# Patient Record
(demographics unavailable — no encounter records)

---

## 2025-02-06 NOTE — HISTORY OF PRESENT ILLNESS
[de-identified] : Abdominal pain  referred by  JOS DILLON , is  a 10 year old female here for initial consultation for abdominal pain, vomiting and diarrhea for the past 2 months.   Patient reports abdominal pain that started a couple of weeks before [place], initially sporadic but now occurring daily. Pain is described as twisting and turning, located around the belly button, lasting 30 minutes to an hour, and occurring throughout the day. Pain severity is rated as less than 10/10. Symptoms worsen with eating but also occur without food intake. Patient experiences diarrhea approximately twice a week, coinciding with pain episodes. Vomiting occurs less frequently, with the last episode being mostly liquid and foamy white.  Stools are described as ([place] Stool Chart type 1-2) every other day, requiring straining. Recent treatment with Pepsid worsened symptoms. No weight loss reported. This past week she had abdominal pain and vomiting.  Abdominal pain was worse than previously.  She stayed home from school 2 days.  Did not have a bowel movement on Tuesday so dad gave her Dulcolax suppository.  She then had some loose stools.   Did see the PCP 2 weeks ago for the symptoms.  Episode was recommended and tried.  Pepcid exacerbated her symptoms.  Labs were done.  CBC, CMP, celiac screen, CRP were all normal.   no weight loss varied diet.   does eat cheese  Stools are described as every other day.  usually look like type I or II.  does have diarrhea intermittently as well.  endorsees straining.  no blood or mucus noted.   Denies  easy bleeding or bruising, jaundice, hematochezia, melena, recurrent fevers or infection, mouth sores, joint swelling, vision changes, unintentional weight loss.   Denies choking, dysphagia, cyanosis with feeds.

## 2025-02-06 NOTE — ASSESSMENT
[Educated Patient & Family about Diagnosis] : educated the patient and family about the diagnosis [FreeTextEntry1] : - Order stool test for infection and Calprotectin to assess for inflammation  - Recommend Calm gummies for kids (1-2 per day) to soften stools  - Increase dietary fiber intake  - Trial lactose-free diet for 5-7 days or switch to lower lactose cheeses  - Prescribe  IBGuard (peppermint oil) capsules or Iberogast for GI discomfort  - Prescribe hyoscyamine as needed for severe pain (antispasmodic)  - Follow-up appointment in 6 weeks  - Pharmacy: [facility] in [place]

## 2025-02-06 NOTE — CONSULT LETTER
[Dear  ___] : Dear  [unfilled], [Consult Letter:] : I had the pleasure of evaluating your patient, [unfilled]. [Please see my note below.] : Please see my note below. [Consult Closing:] : Thank you very much for allowing me to participate in the care of this patient.  If you have any questions, please do not hesitate to contact me. [Sincerely,] : Sincerely, [FreeTextEntry3] : Nguyen Baker MD [facility] physician partners Pediatric gastroenterologist , [facility] of medicine at [facility] [phone] [email or web address]

## 2025-02-06 NOTE — CONSULT LETTER
[Dear  ___] : Dear  [unfilled], [Consult Letter:] : I had the pleasure of evaluating your patient, [unfilled]. [Please see my note below.] : Please see my note below. [Consult Closing:] : Thank you very much for allowing me to participate in the care of this patient.  If you have any questions, please do not hesitate to contact me. [Sincerely,] : Sincerely, [FreeTextEntry3] : Nguyen Baker MD [facility] physician partners Pediatric gastroenterologist , [facility] of medicine at [facility] [phone] [email or web address]

## 2025-02-06 NOTE — HISTORY OF PRESENT ILLNESS
[de-identified] : Abdominal pain  referred by  JOS DILLON , is  a 10 year old female here for initial consultation for abdominal pain, vomiting and diarrhea for the past 2 months.   Patient reports abdominal pain that started a couple of weeks before [place], initially sporadic but now occurring daily. Pain is described as twisting and turning, located around the belly button, lasting 30 minutes to an hour, and occurring throughout the day. Pain severity is rated as less than 10/10. Symptoms worsen with eating but also occur without food intake. Patient experiences diarrhea approximately twice a week, coinciding with pain episodes. Vomiting occurs less frequently, with the last episode being mostly liquid and foamy white.  Stools are described as ([place] Stool Chart type 1-2) every other day, requiring straining. Recent treatment with Pepsid worsened symptoms. No weight loss reported. This past week she had abdominal pain and vomiting.  Abdominal pain was worse than previously.  She stayed home from school 2 days.  Did not have a bowel movement on Tuesday so dad gave her Dulcolax suppository.  She then had some loose stools.   Did see the PCP 2 weeks ago for the symptoms.  Episode was recommended and tried.  Pepcid exacerbated her symptoms.  Labs were done.  CBC, CMP, celiac screen, CRP were all normal.   no weight loss varied diet.   does eat cheese  Stools are described as every other day.  usually look like type I or II.  does have diarrhea intermittently as well.  endorsees straining.  no blood or mucus noted.   Denies  easy bleeding or bruising, jaundice, hematochezia, melena, recurrent fevers or infection, mouth sores, joint swelling, vision changes, unintentional weight loss.   Denies choking, dysphagia, cyanosis with feeds.

## 2025-02-06 NOTE — PAST MEDICAL HISTORY
[At ___ Weeks Gestation] : at [unfilled] weeks gestation [ Section] : by  section [] : There were no problems passing meconium within 24 - 48 hrs of life

## 2025-03-20 NOTE — CONSULT LETTER
[Dear  ___] : Dear  [unfilled], [Consult Letter:] : I had the pleasure of evaluating your patient, [unfilled]. [Please see my note below.] : Please see my note below. [Consult Closing:] : Thank you very much for allowing me to participate in the care of this patient.  If you have any questions, please do not hesitate to contact me. [Sincerely,] : Sincerely, [FreeTextEntry3] : Nguyen Baker MD [facility] physician partners Pediatric gastroenterologist , [facility] of medicine at [facility] [phone] [email or web address]

## 2025-03-20 NOTE — ASSESSMENT
[Educated Patient & Family about Diagnosis] : educated the patient and family about the diagnosis [FreeTextEntry1] : 10-year-old female with intermittent periumbilical abdominal pain, obstipation and nausea.  Labs including calprotectin normal.  GI PCR notable for astrovirus.  Some improvement with IBgard and fiber supplementation.  Continues to have hard large stools every other day.  Differential diagnosis includes constipation, versus IBS   Would recommend 2 caps of MiraLAX daily for 5 days and then 1 cap of MiraLAX daily.  If stools are not almost daily and not soft consistency recommend 1 Dulcolax tablet daily  Continue IBgard and fiber  Reviewed proper toileting techniques and advised not to sit more than 10 minutes  Reviewed daily exercise needs  Use Zofran every 8 hours as needed for nausea or ginger candies  Follow-up in 8 weeks or as needed

## 2025-03-20 NOTE — HISTORY OF PRESENT ILLNESS
[de-identified] : Abdominal pain  referred by  JOS DILLON , is  a 10 year old female here for  FU  consultation for abdominal pain, vomiting and diarrhea for the past 2 months.   now taking IBgard and fiber daily and peppermint tea.,  Calm supplementation made it worse.  Hyoscyamine also made her abdominal pain worse. pain is periumbical.  pain is still several times a week.  Has improved.  Pain is mostly before and after bowel movements.  She does strain a lot.  Also endorses nausea at the same time as pain.  No vomiting.  varied diet.   does eat cheese  Stools are described as every other day.  stools are large and combination of type IV and II. tried magnesium but made her feel worse.    Denies  easy bleeding or bruising, jaundice, hematochezia, melena, recurrent fevers or infection, mouth sores, joint swelling, vision changes, unintentional weight loss.   Denies choking, dysphagia, cyanosis with feeds.

## 2025-03-20 NOTE — REASON FOR VISIT
[Consultation Follow Up] : a consultation follow up  [Father] : father [FreeTextEntry2] : Chronic periumbilical pain and nausea

## 2025-05-22 NOTE — CONSULT LETTER
[Dear  ___] : Dear  [unfilled], [Consult Letter:] : I had the pleasure of evaluating your patient, [unfilled]. [Please see my note below.] : Please see my note below. [Consult Closing:] : Thank you very much for allowing me to participate in the care of this patient.  If you have any questions, please do not hesitate to contact me. [Sincerely,] : Sincerely, [FreeTextEntry3] : Nguyen Baker MD [facility] physician partners Pediatric gastroenterologist , [facility] of medicine at [facility] [phone] [email or web address]

## 2025-05-22 NOTE — HISTORY OF PRESENT ILLNESS
[de-identified] : Abdominal pain  referred by  JOS DILLON , is  a 10 year old female here for  FU  consultation for abdominal pain, vomiting and diarrhea for the past 2 months.  continues on miralax 1 cap daily.   intermittent periumbilical cramping.  1-2 times a week. stools are now daily.   can be hard at times but mostly soft.  intermittent diarrhea . can use Citrucel as needed. no vomiting noted.  no nausea noted.    varied diet.   does eat cheese  Stools are described as every other day.  stools are large and combination of type IV and II. tried magnesium but made her feel worse.    Denies  easy bleeding or bruising, jaundice, hematochezia, melena, recurrent fevers or infection, mouth sores, joint swelling, vision changes, unintentional weight loss.   Denies choking, dysphagia, cyanosis with feeds.

## 2025-05-22 NOTE — ASSESSMENT
[Educated Patient & Family about Diagnosis] : educated the patient and family about the diagnosis [FreeTextEntry1] : 10-year-old female with intermittent periumbilical abdominal pain, obstipation and nausea.  Labs including calprotectin normal.  GI PCR notable for astrovirus.   she is  showing good response to current regimen with improvement in constipation symptoms and associated pain. Some variability in stool consistency but overall better controlled.  Plan:  Continue Miralax 1 cap daily as needed: May not require daily dosing if symptoms remain controlled Monitor response when doses are missed Alternative options discussed: Citrucel/Metamucil capsules as needed Natural fiber sources: High-fiber fruits (passion fruit, guava, kiwi) Luis E/flax/hemp seeds Discussed fiber content of various fruits: Kiwi (5g + natural laxative properties) Passion fruit (24g/cup with pulp) Guava (9g) Avocado (5g/half) Follow up before school starts (August/September) Plan was reviewed with patient and parent as described above and all questions answered accordingly. They demonstrated understanding and agreed with the treatment plan.  This note was created using Dragon Voice Recognition Software and may have been partially created using [place] [address] software which was then reviewed and edited to the best of my ability. Sporadic inaccurate translation may have occurred.  I spent [30 mins face-to-face along with documentation, reviewing relevant imaging, labs, coordination of care

## 2025-05-22 NOTE — REASON FOR VISIT
[Consultation Follow Up] : a consultation follow up  [Patient] : patient [Father] : father [FreeTextEntry2] : Chronic periumblical pain